# Patient Record
Sex: MALE | Race: WHITE | NOT HISPANIC OR LATINO | Employment: OTHER | URBAN - METROPOLITAN AREA
[De-identification: names, ages, dates, MRNs, and addresses within clinical notes are randomized per-mention and may not be internally consistent; named-entity substitution may affect disease eponyms.]

---

## 2017-07-06 ENCOUNTER — ALLSCRIPTS OFFICE VISIT (OUTPATIENT)
Dept: OTHER | Facility: OTHER | Age: 77
End: 2017-07-06

## 2017-07-06 DIAGNOSIS — Z12.5 ENCOUNTER FOR SCREENING FOR MALIGNANT NEOPLASM OF PROSTATE: ICD-10-CM

## 2017-07-06 LAB
BILIRUB UR QL STRIP: NORMAL
CLARITY UR: NORMAL
COLOR UR: YELLOW
GLUCOSE (HISTORICAL): NORMAL
HGB UR QL STRIP.AUTO: NORMAL
KETONES UR STRIP-MCNC: NORMAL MG/DL
LEUKOCYTE ESTERASE UR QL STRIP: NORMAL
NITRITE UR QL STRIP: NORMAL
PH UR STRIP.AUTO: 6.5 [PH]
PROT UR STRIP-MCNC: NORMAL MG/DL
SP GR UR STRIP.AUTO: 1.02
UROBILINOGEN UR QL STRIP.AUTO: 1

## 2017-07-17 ENCOUNTER — GENERIC CONVERSION - ENCOUNTER (OUTPATIENT)
Dept: OTHER | Facility: OTHER | Age: 77
End: 2017-07-17

## 2018-01-16 NOTE — MISCELLANEOUS
Message   Recorded as Task   Date: 07/17/2017 09:47 AM, Created By: Nadine Mayfield   Task Name: Call Back   Assigned To: Niranjan Moe,TEAM   Regarding Patient: Rama Spurling, Status: Active   [facility] 17 Jul 2017 9:47 AM     TASK CREATED  Caller: Self; Other; [phone] (Home)  PATIENT CALLED ASKING IF WE RECIEVED HIS LAB RESULTS PLEASE CALL HIM BACK   [facility] - 17 Jul 2017 10:06 AM     TASK EDITED  pt had labs done at [address] [place] [address] [address]  I advised him i will need to look and see if the faxed results have been received  If not, I can print them from [address] [place] [address],[address]  Active Problems    1  Acute bronchitis (466 0) (J20 9)   2  Allergic rhinitis (477 9) (J30 9)   3  Chronic interstitial cystitis (595 1) (N30 10)   4  Encounter for prostate cancer screening (V76 44) (Z12 5)   5  Hematospermia (608 82) (R36 1)   6  Hematuria, microscopic (599 72) (R31 29)   7  Hydrocele, right (603 9) (N43 3)   8  Hypertension (401 9) (I10)   9  Obstructive sleep apnea (327 23) (G47 33)    Current Meds   1  Aloe Vera CAPS; Therapy: (Recorded:[id number]) to Recorded   2  Cheratussin -10 MG/5ML Oral Syrup; TAKE 5 ML EVERY 4 TO 6 HOURS AS   NEEDED FOR COUGH; Therapy: 19Apr2016 to (Evaluate:05Tcr0906); Last Rx:[id number] Ordered   3  Daily Vitamins Oral Tablet; Therapy: (Recorded:[id number]) to Recorded   4  Elmiron 100 MG Oral Capsule; TAKE 1 CAPSULE 3 TIMES DAILY; Therapy: 19Apr2016 to Recorded   5  Fluticasone Propionate 50 MCG/ACT Nasal Suspension; USE 2 SPRAYS IN EACH   NOSTRIL ONCE DAILY; Therapy: 19Apr2016 to (Renew:25Jpv9329)  Requested for: 19Apr2016; Last   Rx:19Apr2016 Ordered   6  LevoFLOXacin 500 MG Oral Tablet; TAKE 1 TABLET DAILY UNTIL FINISHED; Therapy: 19Apr2016 to (Hayley Laird)  Requested for: 19Apr2016; Last   Rx:[id number] Ordered   7  Lisinopril 5 MG Oral Tablet; TAKE 1 TABLET DAILY AS DIRECTED; Therapy: 19Apr2016 to Recorded   8   Lisinopril 5 MG Oral Tablet; Therapy: (Recorded:[id number]) to Recorded   9  Mucinex 600 MG Oral Tablet Extended Release 12 Hour; TAKE 1 TABLET EVERY 12   HOURS AS NEEDED FOR CONGESTION; Therapy: 19Apr2016 to (King Melyssa)  Requested for: 19Apr2016; Last   Rx:19Apr2016 Ordered   10  Prelief 340 (65-50) MG (CA-P) Oral Tablet; Therapy: (Recorded:85Ynh6955) to Recorded    Allergies    1  No Known Drug Allergies    2   Gluten    Signatures   Electronically signed by : Phillip Adan, ; Jul 17 2017 10:07AM EST                       (Author)

## 2018-01-22 VITALS
BODY MASS INDEX: 30.42 KG/M2 | SYSTOLIC BLOOD PRESSURE: 131 MMHG | HEIGHT: 74 IN | DIASTOLIC BLOOD PRESSURE: 76 MMHG | WEIGHT: 237 LBS

## 2018-05-18 ENCOUNTER — TELEPHONE (OUTPATIENT)
Dept: UROLOGY | Facility: AMBULATORY SURGERY CENTER | Age: 78
End: 2018-05-18

## 2018-05-18 NOTE — TELEPHONE ENCOUNTER
Ama from Dr Marco Antonio Dorsey office called requesting notes and labs on this patient's last visit with us  Dr Jun Troy is his PCP  I was unable to pull any detailed info for her  Please fax this info over to her at [phone]  If any questions, she can be reached at [phone]

## 2018-06-29 DIAGNOSIS — N30.10 INTERSTITIAL CYSTITIS: Primary | ICD-10-CM

## 2018-06-29 RX ORDER — PENTOSAN POLYSULFATE SODIUM 100 MG/1
CAPSULE, GELATIN COATED ORAL
Qty: 270 CAPSULE | Refills: 2 | Status: SHIPPED | OUTPATIENT
Start: 2018-06-29 | End: 2019-03-26 | Stop reason: SDUPTHER

## 2018-08-15 RX ORDER — MULTIVITAMIN
TABLET ORAL
COMMUNITY

## 2018-08-15 RX ORDER — LISINOPRIL 5 MG/1
1 TABLET ORAL DAILY
COMMUNITY
Start: 2016-04-19

## 2018-08-17 ENCOUNTER — OFFICE VISIT (OUTPATIENT)
Dept: UROLOGY | Facility: MEDICAL CENTER | Age: 78
End: 2018-08-17
Payer: MEDICARE

## 2018-08-17 VITALS
HEIGHT: 74 IN | WEIGHT: 239 LBS | BODY MASS INDEX: 30.67 KG/M2 | SYSTOLIC BLOOD PRESSURE: 162 MMHG | DIASTOLIC BLOOD PRESSURE: 88 MMHG

## 2018-08-17 DIAGNOSIS — N30.10 INTERSTITIAL CYSTITIS: Primary | ICD-10-CM

## 2018-08-17 DIAGNOSIS — Z12.5 ENCOUNTER FOR PROSTATE CANCER SCREENING: ICD-10-CM

## 2018-08-17 DIAGNOSIS — N43.3 HYDROCELE, BILATERAL: ICD-10-CM

## 2018-08-17 LAB
SL AMB  POCT GLUCOSE, UA: ABNORMAL
SL AMB LEUKOCYTE ESTERASE,UA: ABNORMAL
SL AMB POCT BILIRUBIN,UA: ABNORMAL
SL AMB POCT BLOOD,UA: ABNORMAL
SL AMB POCT CLARITY,UA: CLEAR
SL AMB POCT COLOR,UA: YELLOW
SL AMB POCT KETONES,UA: ABNORMAL
SL AMB POCT NITRITE,UA: ABNORMAL
SL AMB POCT PH,UA: 5.5
SL AMB POCT SPECIFIC GRAVITY,UA: 1.01
SL AMB POCT URINE PROTEIN: ABNORMAL
SL AMB POCT UROBILINOGEN: 0.2

## 2018-08-17 PROCEDURE — 99214 OFFICE O/P EST MOD 30 MIN: CPT | Performed by: UROLOGY

## 2018-08-17 PROCEDURE — 81003 URINALYSIS AUTO W/O SCOPE: CPT | Performed by: UROLOGY

## 2018-08-17 RX ORDER — ASPIRIN 81 MG/1
81 TABLET ORAL DAILY
COMMUNITY

## 2018-08-17 NOTE — LETTER
2018     Dain Bae DO  [id number] [facility]    Patient: Pollo Bradley   YOB: 1940   Date of Visit: 2018       Dear Dr Nathan Topete: Thank you for referring Pollo Bradley to me for evaluation  Below are my notes for this consultation  If you have questions, please do not hesitate to call me  I look forward to following your patient along with you  Sincerely,        Deb Almaguer MD        CC: No Recipients  Deb Almaguer MD  2018  1:45 PM  Sign at close encounter  [address] [facility]  [address], [address] [place] [address]  [phone]  www  [facility]  org      NAME: Pollo Bradley  AGE: 78 y o  SEX: male  : 1940   MRN: [id number]    DATE: 2018  TIME: 1:35 PM    Assessment and Plan:    Interstitial cystitis, well managed with Elmiron  Bilateral hydroceles, right greater than left  We discussed surgical intervention versus observation  They do not really bother him very much, so surgical intervention is not absolutely warranted  We will get an ultrasound of the scrotum and I will send him the results  We will also check PSA and send him the results of that  If he decides that he wants the hydroceles treated in the meantime he can give me a call  Follow-up in 1 year  Chief Complaint     Chief Complaint   Patient presents with    interstitial cystitis     6 months ck       History of Present Illness   Here for follow-up of his her matter sperm and interstitial cystitis  He has been on Elmiron  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 and a bladder biopsy that showed ulceration only  I have performed cystoscopy with biopsy in , 2013 in   He also has an asymptomatic right hydrocele  He has nocturia 1 time a night    His main complaint this visit is pain in his bladder If he sleeps for 3-4 hours at a stretch     The pain dissipates when he sits up and when he goes to walk  During the days doing well and less he left his bladder get too full that he has pain in the bladder  No recent PSA  Last PSA was 1 49 7/10/2017  The following portions of the patient's history were reviewed and updated as appropriate: allergies, current medications, past family history, past medical history, past social history, past surgical history and problem list     Review of Systems   Review of Systems   Genitourinary: Negative  Active Problem List   There is no problem list on file for this patient  Objective   /88 (BP Location: Left arm, Patient Position: Sitting)   Ht 6' 2" (1 88 m)   Wt 108 kg (239 lb)   BMI 30 69 kg/m²      Physical Exam   Constitutional: He is oriented to person, place, and time  He appears well-developed and well-nourished  HENT:   Head: Normocephalic and atraumatic  Eyes: EOM are normal    Neck: Normal range of motion  Pulmonary/Chest: Effort normal    Abdominal: Soft  Genitourinary: Rectum normal and prostate normal    Genitourinary Comments: Has bilateral hydroceles, right greater than left  The right side is about 7 cm this year and he has about a 4-5 cm hydrocele on the left  The testicles themselves are normal   No hernia  Rectal exam reveals normal tone no masses and the prostate is about 60 g, smooth symmetric and benign  Musculoskeletal: Normal range of motion  Neurological: He is alert and oriented to person, place, and time  Skin: Skin is warm and dry  Psychiatric: He has a normal mood and affect   His behavior is normal  Judgment and thought content normal            Current Medications     Current Outpatient Prescriptions:     aspirin (ECOTRIN LOW STRENGTH) 81 mg EC tablet, Take 81 mg by mouth daily, Disp: , Rfl:     Calcium Glycerophosphate (PRELIEF) 340 (65-50) MG (CA-P) TABS, Take by mouth, Disp: , Rfl:    ELMIRON 100 MG capsule, TAKE 1 CAPSULE THREE TIMES A DAY, Disp: 270 capsule, Rfl: 2    lisinopril (ZESTRIL) 5 mg tablet, Take 1 tablet by mouth daily, Disp: , Rfl:     Multiple Vitamin (DAILY VITAMINS) tablet, Take by mouth, Disp: , Rfl:         Fredrick Jeong MD

## 2018-08-17 NOTE — PROGRESS NOTES
[address] [facility]  [facility]  [address] [facility]  [place], [address] [facility]  [phone]  www  [facility]  org      NAME: Chidi Johnson  AGE: 78 y o  SEX: male  : 1940   MRN: [id number]    DATE: 2018  TIME: 1:35 PM    Assessment and Plan:    Interstitial cystitis, well managed with Elmiron  Bilateral hydroceles, right greater than left  We discussed surgical intervention versus observation  They do not really bother him very much, so surgical intervention is not absolutely warranted  We will get an ultrasound of the scrotum and I will send him the results  We will also check PSA and send him the results of that  If he decides that he wants the hydroceles treated in the meantime he can give me a call  Follow-up in 1 year  Chief Complaint     Chief Complaint   Patient presents with    interstitial cystitis     6 months ck       History of Present Illness   Here for follow-up of his her matter sperm and interstitial cystitis  He has been on Elmiron  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 and a bladder biopsy that showed ulceration only  I have performed cystoscopy with biopsy in , 2013 in   He also has an asymptomatic right hydrocele  He has nocturia 1 time a night  His main complaint this visit is pain in his bladder   If he sleeps for 3-4 hours at a stretch     The pain dissipates when he sits up and when he goes to walk  During the days doing well and less he left his bladder get too full that he has pain in the bladder  No recent PSA  Last PSA was 1 49 7/10/2017          The following portions of the patient's history were reviewed and updated as appropriate: allergies, current medications, past family history, past medical history, past social history, past surgical history and problem list     Review of Systems   Review of Systems   Genitourinary: Negative  Active Problem List   There is no problem list on file for this patient  Objective   /88 (BP Location: Left arm, Patient Position: Sitting)   Ht 6' 2" (1 88 m)   Wt 108 kg (239 lb)   BMI 30 69 kg/m²     Physical Exam   Constitutional: He is oriented to person, place, and time  He appears well-developed and well-nourished  HENT:   Head: Normocephalic and atraumatic  Eyes: EOM are normal    Neck: Normal range of motion  Pulmonary/Chest: Effort normal    Abdominal: Soft  Genitourinary: Rectum normal and prostate normal    Genitourinary Comments: Has bilateral hydroceles, right greater than left  The right side is about 7 cm this year and he has about a 4-5 cm hydrocele on the left  The testicles themselves are normal   No hernia  Rectal exam reveals normal tone no masses and the prostate is about 60 g, smooth symmetric and benign  Musculoskeletal: Normal range of motion  Neurological: He is alert and oriented to person, place, and time  Skin: Skin is warm and dry  Psychiatric: He has a normal mood and affect   His behavior is normal  Judgment and thought content normal            Current Medications     Current Outpatient Prescriptions:     aspirin (ECOTRIN LOW STRENGTH) 81 mg EC tablet, Take 81 mg by mouth daily, Disp: , Rfl:     Calcium Glycerophosphate (PRELIEF) 340 (65-50) MG (CA-P) TABS, Take by mouth, Disp: , Rfl:     ELMIRON 100 MG capsule, TAKE 1 CAPSULE THREE TIMES A DAY, Disp: 270 capsule, Rfl: 2    lisinopril (ZESTRIL) 5 mg tablet, Take 1 tablet by mouth daily, Disp: , Rfl:     Multiple Vitamin (DAILY VITAMINS) tablet, Take by mouth, Disp: , Rfl:         Marysol Jessica MD

## 2018-08-21 ENCOUNTER — HOSPITAL ENCOUNTER (OUTPATIENT)
Dept: ULTRASOUND IMAGING | Facility: HOSPITAL | Age: 78
Discharge: HOME | End: 2018-08-21
Payer: MEDICARE

## 2018-08-21 DIAGNOSIS — N43.3 HYDROCELE, BILATERAL: ICD-10-CM

## 2018-08-21 PROCEDURE — 76870 US EXAM SCROTUM: CPT

## 2018-08-23 ENCOUNTER — TELEPHONE (OUTPATIENT)
Dept: UROLOGY | Facility: AMBULATORY SURGERY CENTER | Age: 78
End: 2018-08-23

## 2018-08-23 NOTE — TELEPHONE ENCOUNTER
Spoke with patient's wife,she  would like a call back from one of the nurses  She said she got a call, but I do not see any documentation here  Please advise and call back

## 2019-03-26 DIAGNOSIS — N30.10 INTERSTITIAL CYSTITIS: ICD-10-CM

## 2019-03-26 RX ORDER — PENTOSAN POLYSULFATE SODIUM 100 MG/1
CAPSULE, GELATIN COATED ORAL
Qty: 270 CAPSULE | Refills: 2 | Status: SHIPPED | OUTPATIENT
Start: 2019-03-26 | End: 2019-12-21 | Stop reason: SDUPTHER

## 2019-08-23 RX ORDER — LORATADINE 10 MG/1
10 TABLET ORAL DAILY
COMMUNITY
Start: 2019-04-08 | End: 2019-08-26 | Stop reason: ALTCHOICE

## 2019-08-26 ENCOUNTER — OFFICE VISIT (OUTPATIENT)
Dept: UROLOGY | Facility: MEDICAL CENTER | Age: 79
End: 2019-08-26
Payer: MEDICARE

## 2019-08-26 VITALS
SYSTOLIC BLOOD PRESSURE: 140 MMHG | BODY MASS INDEX: 30.42 KG/M2 | HEART RATE: 75 BPM | DIASTOLIC BLOOD PRESSURE: 90 MMHG | WEIGHT: 237 LBS | HEIGHT: 74 IN

## 2019-08-26 DIAGNOSIS — N43.2 OTHER HYDROCELE: ICD-10-CM

## 2019-08-26 DIAGNOSIS — N30.10 INTERSTITIAL CYSTITIS: Primary | ICD-10-CM

## 2019-08-26 LAB
SL AMB  POCT GLUCOSE, UA: ABNORMAL
SL AMB LEUKOCYTE ESTERASE,UA: ABNORMAL
SL AMB POCT BILIRUBIN,UA: ABNORMAL
SL AMB POCT BLOOD,UA: ABNORMAL
SL AMB POCT CLARITY,UA: CLEAR
SL AMB POCT COLOR,UA: YELLOW
SL AMB POCT KETONES,UA: ABNORMAL
SL AMB POCT NITRITE,UA: ABNORMAL
SL AMB POCT PH,UA: 6
SL AMB POCT SPECIFIC GRAVITY,UA: 1.02
SL AMB POCT URINE PROTEIN: ABNORMAL
SL AMB POCT UROBILINOGEN: 1

## 2019-08-26 PROCEDURE — 99214 OFFICE O/P EST MOD 30 MIN: CPT | Performed by: UROLOGY

## 2019-08-26 PROCEDURE — 81003 URINALYSIS AUTO W/O SCOPE: CPT | Performed by: UROLOGY

## 2019-08-26 NOTE — H&P
[address] [facility]  [facility]  [address] [facility]  [place], [address] [place] [address]  [phone]  www  [facility]  org      NAME: Mariana Munson  AGE: 70 y o  SEX: male  : 1940   MRN: [id number]    DATE: 2019  TIME: 1:47 PM    Assessment and Plan:    Bilateral hydroceles: He would like to have these repaired  He will therefore be scheduled for bilateral hydrocelectomy in the operating room  Interstitial cystitis:  Under good control on Elmiron and Prelief  Chief Complaint   No chief complaint on file  History of Present Illness   Yearly follow-up of interstitial cystitis, and bilateral hydroceles  Also encounter for prostate cancer screening  PSA was 1 07 as of 2019  Scrotal ultrasound 1 year ago showed large bilateral scrotal hydroceles increased since the prior examination with no lesions  He continues on Elmiron  He also takes Prelief  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  Bilateral hydroceles: These have gotten larger  He would like to have them repaired  I explained him the procedure of hydrocelectomy, bilateral and the risks of bleeding, infection, loss of both or 1 of the testicles  There is also a small recurrence rate  He gives informed consent  The following portions of the patient's history were reviewed and updated as appropriate: allergies, current medications, past family history, past medical history, past social history, past surgical history and problem list   Past Medical History:   Diagnosis Date    Bladder pain     Dysuria     Hematospermia     Interstitial cystitis     Microscopic hematuria      Past Surgical History:   Procedure Laterality Date    BACK SURGERY      CYSTOSCOPY      CYSTOSTOMY W/ BLADDER BIOPSY  2011     shoulder  Review of Systems   Review of Systems   Constitutional: Negative for activity change, appetite change, fever and unexpected weight change  Respiratory: Negative  Cardiovascular: Negative  Gastrointestinal: Negative  Genitourinary: Positive for scrotal swelling  Negative for decreased urine volume, difficulty urinating, discharge, dysuria, enuresis, flank pain, frequency, genital sores, hematuria, penile pain, penile swelling, testicular pain and urgency  Neurological: Negative  Hematological: Negative  Active Problem List   There is no problem list on file for this patient  Objective   /90   Pulse 75   Ht 6' 2" (1 88 m)   Wt 108 kg (237 lb)   BMI 30 43 kg/m²      Physical Exam   Constitutional: He is oriented to person, place, and time  He appears well-developed and well-nourished  HENT:   Head: Normocephalic and atraumatic  Eyes: Conjunctivae and EOM are normal    Neck: Normal range of motion  Neck supple  Cardiovascular: Normal rate, regular rhythm and normal heart sounds  Exam reveals no gallop and no friction rub  No murmur heard  Pulmonary/Chest: Effort normal and breath sounds normal  No stridor  No respiratory distress  He has no wheezes  He has no rales  He exhibits no tenderness  Abdominal: Soft  He exhibits no distension and no mass  There is no tenderness  There is no rebound and no guarding  No hernia  Genitourinary: Rectum normal, prostate normal and penis normal  No penile tenderness  Genitourinary Comments: Rectal exam reveals normal tone, no masses and his prostate was about 40 g, smooth symmetric and benign  No nodules  He has normal circumcised phallus  He has bilateral large hydroceles  Musculoskeletal: Normal range of motion  Neurological: He is alert and oriented to person, place, and time  Skin: Skin is warm and dry  No erythema  No pallor  Psychiatric: He has a normal mood and affect   His behavior is normal  Judgment and thought content normal            Current Medications     Current Outpatient Medications:     aspirin (ECOTRIN LOW STRENGTH) 81 mg EC tablet, Take 81 mg by mouth daily, Disp: , Rfl:     Calcium Glycerophosphate (PRELIEF) 340 (65-50) MG (CA-P) TABS, Take by mouth, Disp: , Rfl:     ELMIRON 100 MG capsule, TAKE 1 CAPSULE THREE TIMES A DAY, Disp: 270 capsule, Rfl: 2    lisinopril (ZESTRIL) 5 mg tablet, Take 1 tablet by mouth daily, Disp: , Rfl:     Multiple Vitamin (DAILY VITAMINS) tablet, Take by mouth, Disp: , Rfl:         Adwoa Becker MD

## 2019-08-26 NOTE — PROGRESS NOTES
[address] [facility]  [facility]  [address] [facility]  [place], [address] [place] [address]  [phone]  www  [facility]  org      NAME: Shannan Luu  AGE: 70 y o  SEX: male  : 1940   MRN: [id number]    DATE: 2019  TIME: 1:47 PM    Assessment and Plan:    Bilateral hydroceles: He would like to have these repaired  He will therefore be scheduled for bilateral hydrocelectomy in the operating room  Interstitial cystitis:  Under good control on Elmiron and Prelief  Chief Complaint   No chief complaint on file  History of Present Illness   Yearly follow-up of interstitial cystitis, and bilateral hydroceles  Also encounter for prostate cancer screening  PSA was 1 07 as of 2019  Scrotal ultrasound 1 year ago showed large bilateral scrotal hydroceles increased since the prior examination with no lesions  He continues on Elmiron  He also takes Prelief  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  Bilateral hydroceles: These have gotten larger  He would like to have them repaired  I explained him the procedure of hydrocelectomy, bilateral and the risks of bleeding, infection, loss of both or 1 of the testicles  There is also a small recurrence rate  He gives informed consent  The following portions of the patient's history were reviewed and updated as appropriate: allergies, current medications, past family history, past medical history, past social history, past surgical history and problem list   Past Medical History:   Diagnosis Date    Bladder pain     Dysuria     Hematospermia     Interstitial cystitis     Microscopic hematuria      Past Surgical History:   Procedure Laterality Date    BACK SURGERY  2015    CYSTOSCOPY      CYSTOSTOMY W/ BLADDER BIOPSY  2011     shoulder  Review of Systems   Review of Systems   Constitutional: Negative for activity change, appetite change, fever and unexpected weight change  Respiratory: Negative  Cardiovascular: Negative  Gastrointestinal: Negative  Genitourinary: Positive for scrotal swelling  Negative for decreased urine volume, difficulty urinating, discharge, dysuria, enuresis, flank pain, frequency, genital sores, hematuria, penile pain, penile swelling, testicular pain and urgency  Neurological: Negative  Hematological: Negative  Active Problem List   There is no problem list on file for this patient  Objective   /90   Pulse 75   Ht 6' 2" (1 88 m)   Wt 108 kg (237 lb)   BMI 30 43 kg/m²     Physical Exam   Constitutional: He is oriented to person, place, and time  He appears well-developed and well-nourished  HENT:   Head: Normocephalic and atraumatic  Eyes: Conjunctivae and EOM are normal    Neck: Normal range of motion  Neck supple  Cardiovascular: Normal rate, regular rhythm and normal heart sounds  Exam reveals no gallop and no friction rub  No murmur heard  Pulmonary/Chest: Effort normal and breath sounds normal  No stridor  No respiratory distress  He has no wheezes  He has no rales  He exhibits no tenderness  Abdominal: Soft  He exhibits no distension and no mass  There is no tenderness  There is no rebound and no guarding  No hernia  Genitourinary: Rectum normal, prostate normal and penis normal  No penile tenderness  Genitourinary Comments: Rectal exam reveals normal tone, no masses and his prostate was about 40 g, smooth symmetric and benign  No nodules  He has normal circumcised phallus  He has bilateral large hydroceles  Musculoskeletal: Normal range of motion  Neurological: He is alert and oriented to person, place, and time  Skin: Skin is warm and dry  No erythema  No pallor  Psychiatric: He has a normal mood and affect   His behavior is normal  Judgment and thought content normal            Current Medications     Current Outpatient Medications:     aspirin (ECOTRIN LOW STRENGTH) 81 mg EC tablet, Take 81 mg by mouth daily, Disp: , Rfl:     Calcium Glycerophosphate (PRELIEF) 340 (65-50) MG (CA-P) TABS, Take by mouth, Disp: , Rfl:     ELMIRON 100 MG capsule, TAKE 1 CAPSULE THREE TIMES A DAY, Disp: 270 capsule, Rfl: 2    lisinopril (ZESTRIL) 5 mg tablet, Take 1 tablet by mouth daily, Disp: , Rfl:     Multiple Vitamin (DAILY VITAMINS) tablet, Take by mouth, Disp: , Rfl:         Farrukh Alvarez MD

## 2019-08-27 ENCOUNTER — TELEPHONE (OUTPATIENT)
Dept: UROLOGY | Facility: MEDICAL CENTER | Age: 79
End: 2019-08-27

## 2019-08-28 NOTE — TELEPHONE ENCOUNTER
Patient wife called to advised that the patient decided to not follow through with surgery  Patient wife advised that he would probably need an appointment to see the Dr for follow up  Please advise

## 2019-12-21 DIAGNOSIS — N30.10 INTERSTITIAL CYSTITIS: ICD-10-CM

## 2019-12-23 RX ORDER — PENTOSAN POLYSULFATE SODIUM 100 MG/1
CAPSULE, GELATIN COATED ORAL
Qty: 270 CAPSULE | Refills: 4 | Status: SHIPPED | OUTPATIENT
Start: 2019-12-23 | End: 2021-03-17

## 2020-03-24 LAB — HBA1C MFR BLD HPLC: 5.2 %

## 2020-11-30 ENCOUNTER — TELEPHONE (OUTPATIENT)
Dept: UROLOGY | Facility: MEDICAL CENTER | Age: 80
End: 2020-11-30

## 2021-03-17 DIAGNOSIS — N30.10 INTERSTITIAL CYSTITIS: ICD-10-CM

## 2021-03-17 RX ORDER — PENTOSAN POLYSULFATE SODIUM 100 MG/1
CAPSULE, GELATIN COATED ORAL
Qty: 270 CAPSULE | Refills: 3 | Status: SHIPPED | OUTPATIENT
Start: 2021-03-17 | End: 2022-05-03

## 2021-04-30 RX ORDER — ATORVASTATIN CALCIUM 10 MG/1
TABLET, FILM COATED ORAL
COMMUNITY
Start: 2021-02-22

## 2021-05-03 ENCOUNTER — OFFICE VISIT (OUTPATIENT)
Dept: UROLOGY | Facility: MEDICAL CENTER | Age: 81
End: 2021-05-03
Payer: MEDICARE

## 2021-05-03 VITALS — WEIGHT: 240 LBS | BODY MASS INDEX: 30.8 KG/M2 | HEIGHT: 74 IN

## 2021-05-03 DIAGNOSIS — N30.10 INTERSTITIAL CYSTITIS: Primary | ICD-10-CM

## 2021-05-03 LAB
SL AMB  POCT GLUCOSE, UA: NORMAL
SL AMB LEUKOCYTE ESTERASE,UA: NORMAL
SL AMB POCT BILIRUBIN,UA: NORMAL
SL AMB POCT BLOOD,UA: NORMAL
SL AMB POCT CLARITY,UA: CLEAR
SL AMB POCT COLOR,UA: YELLOW
SL AMB POCT KETONES,UA: NORMAL
SL AMB POCT NITRITE,UA: NORMAL
SL AMB POCT PH,UA: 6.5
SL AMB POCT SPECIFIC GRAVITY,UA: 1.02
SL AMB POCT URINE PROTEIN: NORMAL
SL AMB POCT UROBILINOGEN: 0.2

## 2021-05-03 PROCEDURE — 81003 URINALYSIS AUTO W/O SCOPE: CPT | Performed by: PHYSICIAN ASSISTANT

## 2021-05-03 PROCEDURE — 99213 OFFICE O/P EST LOW 20 MIN: CPT | Performed by: PHYSICIAN ASSISTANT

## 2021-05-03 NOTE — PROGRESS NOTES
5/3/2021      Chief Complaint   Patient presents with    Testicle Swelling         Assessment and Plan    68 y o  male managed by Dr Lina Boyd    1  Bilateral hydrocele  · Cancelled surgical intervention last year  · Symptoms unchanged from last year  · Denies any pain  · Scrotal support reviewed with patient  · Urine today: negative  · Instructed to call our office if his symptoms progress or become more bothersome  · Otherwise will follow-up in 1 year  2  Interstitial cystitis   · Well controlled on Elmiron  · No refills needed at this time  History of Present Illness  Emilia Pickard is a 68 y o  male here for evaluation of bilateral hydroceles and interstitial cystitis  Patient is doing very well today  He denies any testicular pain associated with his bilateral hydroceles  He explained that when he 1st discussed the surgical intervention with Dr Lina Boyd, he was actively riding motorcycles and hyper temp self up for surgery  After further contemplation he decided that it was not necessary and he was interested in going forward with that  Continues to feel as if he does not need this surgical intervention at this time  He denies any urinary symptoms including dysuria, frequency, urgency, weaker intermittent stream, straining to urinate, or sensation of incomplete bladder emptying  He does have have occasional hesitancy in the mornings but denies any issues with incontinence or enuresis  He reports 1 episode of gross hematuria back in March of 2021 after he had a fall  States that his PCP worked his urine up following the incident and did not find any traces of blood  He has not had any subsequent episodes  He continues to feel well controlled on Elmiron for his interstitial cystitis  He is overall doing very well and is agreeable to follow-up in 1 year  He is instructed to call our office if his bilateral hydroceles begin to give him pain or become more bothersome        Review of Systems   Constitutional: Negative for chills and fever  HENT: Negative  Eyes: Negative  Respiratory: Negative  Gastrointestinal: Negative for abdominal pain, constipation, diarrhea, nausea and vomiting  Genitourinary: Positive for scrotal swelling (Hydrocele symptoms remain the same from last year) and urgency (Rarely)  Negative for dysuria, flank pain, frequency, hematuria (1 epsiode in March 2021 s/p fall) and testicular pain  Confirms hesitancy in the mornings  Denies weak or intermittent stream    Denies sensation of incomplete bladder emptying  Denies issues with incontinence  Musculoskeletal: Positive for back pain (Low back pain)  Allergic/Immunologic: Negative  Neurological: Negative  Hematological: Negative  Psychiatric/Behavioral: Negative  AUA SYMPTOM SCORE      Most Recent Value   AUA SYMPTOM SCORE   How often have you had a sensation of not emptying your bladder completely after you finished urinating? 0   How often have you had to urinate again less than two hours after you finished urinating? 0   How often have you found you stopped and started again several times when you urinate?  0   How often have you found it difficult to postpone urination? 0   How often have you had a weak urinary stream?  0   How often have you had to push or strain to begin urination? 0   How many times did you most typically get up to urinate from the time you went to bed at night until the time you got up in the morning? 1   Quality of Life: If you were to spend the rest of your life with your urinary condition just the way it is now, how would you feel about that?  0   AUA SYMPTOM SCORE  1           Vitals  Vitals:    05/03/21 1051   Weight: 109 kg (240 lb)   Height: 6' 2" (1 88 m)       Physical Exam  Constitutional:       General: He is not in acute distress  Appearance: Normal appearance  He is obese  He is not ill-appearing, toxic-appearing or diaphoretic     HENT: Head: Normocephalic and atraumatic  Eyes:      Conjunctiva/sclera: Conjunctivae normal    Neck:      Musculoskeletal: Normal range of motion  Pulmonary:      Effort: Pulmonary effort is normal  No respiratory distress  Abdominal:      General: There is no distension  Palpations: Abdomen is soft  Tenderness: There is no abdominal tenderness  There is no right CVA tenderness, left CVA tenderness, guarding or rebound  Genitourinary:     Comments: Circumcised penis, normal phallus, orthotopic patent meatus  Testes smooth descended bilaterally into the scrotum nontender  Bilateral hydroceles appreciated on exam, R>L  Musculoskeletal: Normal range of motion  Skin:     General: Skin is warm and dry  Neurological:      General: No focal deficit present  Mental Status: He is alert and oriented to person, place, and time  Psychiatric:         Mood and Affect: Mood normal          Behavior: Behavior normal          Thought Content:  Thought content normal          Judgment: Judgment normal        Past History  Past Medical History:   Diagnosis Date    Bladder pain     Dysuria     Hematospermia     Interstitial cystitis     Microscopic hematuria      Social History     Socioeconomic History    Marital status: /Civil Union     Spouse name: None    Number of children: None    Years of education: None    Highest education level: None   Occupational History    None   Social Needs    Financial resource strain: None    Food insecurity     Worry: None     Inability: None    Transportation needs     Medical: None     Non-medical: None   Tobacco Use    Smoking status: Never Smoker    Smokeless tobacco: Never Used   Substance and Sexual Activity    Alcohol use: Yes     Comment: 1/month    Drug use: No    Sexual activity: None   Lifestyle    Physical activity     Days per week: None     Minutes per session: None    Stress: None   Relationships    Social connections     Talks on phone: None     Gets together: None     Attends Caodaism service: None     Active member of club or organization: None     Attends meetings of clubs or organizations: None     Relationship status: None    Intimate partner violence     Fear of current or ex partner: None     Emotionally abused: None     Physically abused: None     Forced sexual activity: None   Other Topics Concern    None   Social History Narrative    None     Social History     Tobacco Use   Smoking Status Never Smoker   Smokeless Tobacco Never Used     Family History   Problem Relation Age of Onset    Dementia Mother     Heart disease Father        The following portions of the patient's history were reviewed and updated as appropriate: allergies, current medications, past medical history, past social history, past surgical history and problem list     Results - results found in Care everywhere    Recent Results (from the past 1 hour(s))   POCT urine dip auto non-scope    Collection Time: 05/03/21 11:26 AM   Result Value Ref Range     COLOR,UA yellow     CLARITY,UA clear     SPECIFIC GRAVITY,UA 1 020      PH,UA 6 5     LEUKOCYTE ESTERASE,UA neg     NITRITE,UA neg     GLUCOSE, UA neg     KETONES,UA neg     BILIRUBIN,UA neg     BLOOD,UA neg     POCT URINE PROTEIN neg     SL AMB POCT UROBILINOGEN 0 2    ]  No results found for: PSA  No results found for: GLUCOSE, CALCIUM, NA, K, CO2, CL, BUN, CREATININE  No results found for: WBC, HGB, HCT, MCV, PLT    Yulisa Merritt PA-C

## 2022-05-03 DIAGNOSIS — N30.10 INTERSTITIAL CYSTITIS: ICD-10-CM

## 2022-05-03 RX ORDER — PENTOSAN POLYSULFATE SODIUM 100 MG/1
CAPSULE, GELATIN COATED ORAL
Qty: 270 CAPSULE | Refills: 3 | Status: SHIPPED | OUTPATIENT
Start: 2022-05-03

## 2022-09-23 ENCOUNTER — OFFICE VISIT (OUTPATIENT)
Dept: UROLOGY | Facility: MEDICAL CENTER | Age: 82
End: 2022-09-23
Payer: MEDICARE

## 2022-09-23 VITALS
SYSTOLIC BLOOD PRESSURE: 128 MMHG | BODY MASS INDEX: 30.91 KG/M2 | HEIGHT: 73 IN | DIASTOLIC BLOOD PRESSURE: 82 MMHG | HEART RATE: 66 BPM | OXYGEN SATURATION: 97 % | WEIGHT: 233.2 LBS

## 2022-09-23 DIAGNOSIS — Z12.5 PROSTATE CANCER SCREENING: ICD-10-CM

## 2022-09-23 DIAGNOSIS — N30.10 INTERSTITIAL CYSTITIS: Primary | ICD-10-CM

## 2022-09-23 LAB
SL AMB  POCT GLUCOSE, UA: NORMAL
SL AMB LEUKOCYTE ESTERASE,UA: NORMAL
SL AMB POCT BILIRUBIN,UA: NORMAL
SL AMB POCT BLOOD,UA: NORMAL
SL AMB POCT CLARITY,UA: CLEAR
SL AMB POCT COLOR,UA: YELLOW
SL AMB POCT KETONES,UA: NORMAL
SL AMB POCT NITRITE,UA: NORMAL
SL AMB POCT PH,UA: 6.5
SL AMB POCT SPECIFIC GRAVITY,UA: 1.01
SL AMB POCT URINE PROTEIN: NORMAL
SL AMB POCT UROBILINOGEN: 0.2

## 2022-09-23 PROCEDURE — 99214 OFFICE O/P EST MOD 30 MIN: CPT | Performed by: PHYSICIAN ASSISTANT

## 2022-09-23 PROCEDURE — 81003 URINALYSIS AUTO W/O SCOPE: CPT | Performed by: PHYSICIAN ASSISTANT

## 2022-09-23 NOTE — PROGRESS NOTES
9/23/2022      Chief Complaint   Patient presents with    interstitial cystitis         Assessment and Plan    80 y o  male managed by Dr Maisha Bui     1  Bilateral hydroceles  · Remains asymptomatic  · Again reviewed surgical intervention at this time  Patient feels comfortable with observation given lack of symptoms  · Instructed to call if he becomes symptomatic    2  Interstitial cystitis   · Urine today: negative   · Controlled on [place]  · No refills needed at this time  · Denies history of DVTs    3  Prostate cancer screening   · Reviewed AUA guidelines for prostate cancer screening  Patient wishes to continue annual prostate cancer screening for now  · PSA ordered  Reviewed activities to avoid 72 hours prior and to wait 2 weeks after today's KASANDRA  · KASANDRA today revealed smooth, mildly enlarged prostate without appreciable nodule  · Will call with PSA results       History of Present Illness  Kj Lerma is a 80 y o  male here for evaluation of bilateral hydroceles and interstitial cystitis  Patient is doing very well today  He denies any testicular pain associated with his bilateral hydroceles  He was previously scheduled for hydrocelectomy in the OR with Dr Maisha Bui but patient had decided not to proceed with this procedure  He has significantly decreased his motorcycle riding denies any pain or affect of ADLs given bilateral hydroceles  He offers no urinary complaints today  He feels well controlled on the [place]miron from an IC perspective  He denies any history of DVTs  Denies any family history of prostate cancer but is agreeable to continuing annual screening  Review of Systems   Constitutional: Negative for chills and fever  HENT: Negative  Respiratory: Negative  Cardiovascular: Negative  Gastrointestinal: Negative for abdominal pain, nausea and vomiting  Genitourinary: Positive for scrotal swelling (unchanged)   Negative for difficulty urinating, dysuria, flank pain, frequency, hematuria, testicular pain and urgency  Musculoskeletal: Negative  Skin: Negative  Neurological: Negative  Vitals  Vitals:    09/23/22 1052   BP: 128/82   BP Location: Left arm   Patient Position: Sitting   Cuff Size: Adult   Pulse: 66   SpO2: 97%   Weight: 106 kg (233 lb 3 2 oz)   Height: 6' 1" (1 854 m)       Physical Exam  Vitals reviewed  Constitutional:       General: He is not in acute distress  Appearance: Normal appearance  He is obese  He is not ill-appearing, toxic-appearing or diaphoretic  HENT:      Head: Normocephalic and atraumatic  Eyes:      Conjunctiva/sclera: Conjunctivae normal    Pulmonary:      Effort: Pulmonary effort is normal  No respiratory distress  Abdominal:      General: There is no distension  Palpations: Abdomen is soft  Tenderness: There is no abdominal tenderness  There is no right CVA tenderness, left CVA tenderness, guarding or rebound  Musculoskeletal:         General: Normal range of motion  Cervical back: Normal range of motion  Skin:     General: Skin is warm and dry  Neurological:      General: No focal deficit present  Mental Status: He is alert and oriented to person, place, and time  Psychiatric:         Mood and Affect: Mood normal          Behavior: Behavior normal          Thought Content:  Thought content normal          Judgment: Judgment normal        Past History  Past Medical History:   Diagnosis Date    Bladder pain     Dysuria     Hematospermia     Interstitial cystitis     Microscopic hematuria      Social History     Socioeconomic History    Marital status: /Civil Union     Spouse name: None    Number of children: None    Years of education: None    Highest education level: None   Occupational History    None   Tobacco Use    Smoking status: Never Smoker    Smokeless tobacco: Never Used   Vaping Use    Vaping Use: Never used   Substance and Sexual Activity    Alcohol use: Yes     Comment: 1/month    Drug use: No    Sexual activity: None   Other Topics Concern    None   Social History Narrative    None     Social Determinants of Health     Financial Resource Strain: Not on file   Food Insecurity: Not on file   Transportation Needs: Not on file   Physical Activity: Not on file   Stress: Not on file   Social Connections: Not on file   Intimate Partner Violence: Not on file   Housing Stability: Not on file     Social History     Tobacco Use   Smoking Status Never Smoker   Smokeless Tobacco Never Used     Family History   Problem Relation Age of Onset    Dementia Mother     Heart disease Father        The following portions of the patient's history were reviewed and updated as appropriate: allergies, current medications, past medical history, past social history, past surgical history and problem list     Results  Recent Results (from the past 1 hour(s))   POCT urine dip auto non-scope    Collection Time: 09/23/22 10:58 AM   Result Value Ref Range     COLOR,UA yellow     CLARITY,UA clear     SPECIFIC GRAVITY,UA 1 015      PH,UA 6 5     LEUKOCYTE ESTERASE,UA neg     NITRITE,UA neg     GLUCOSE, UA neg     KETONES,UA neg     BILIRUBIN,UA neg     BLOOD,UA neg     POCT URINE PROTEIN neg     SL AMB POCT UROBILINOGEN 0 2    ]  No results found for: PSA  No results found for: GLUCOSE, CALCIUM, NA, K, CO2, CL, BUN, CREATININE  No results found for: WBC, HGB, HCT, MCV, PLT    Scott Agudelo PA-C

## 2022-09-23 NOTE — PATIENT INSTRUCTIONS
PSA due in 2 weeks  Please avoid any ejaculation or direct pressure to the scrotum 3 days before testing  Will call with results

## 2022-11-16 ENCOUNTER — TELEPHONE (OUTPATIENT)
Dept: UROLOGY | Facility: MEDICAL CENTER | Age: 82
End: 2022-11-16

## 2022-11-16 DIAGNOSIS — Z12.5 PROSTATE CANCER SCREENING: Primary | ICD-10-CM

## 2022-11-16 NOTE — TELEPHONE ENCOUNTER
Patricia Majano PA-C   11/16/2022 10:01 AM EST Back to Top      PSA stable at 1 29  New PSA orders in for next year       Thanks      Contacted patient advised per provider recommendation of results   Advised post card will be mailed to his home as a reminder to schedule 1 year fu

## 2023-03-20 DIAGNOSIS — N30.10 INTERSTITIAL CYSTITIS: ICD-10-CM

## 2023-03-21 RX ORDER — PENTOSAN POLYSULFATE SODIUM 100 MG/1
CAPSULE, GELATIN COATED ORAL
Qty: 270 CAPSULE | Refills: 3 | Status: SHIPPED | OUTPATIENT
Start: 2023-03-21

## 2024-06-05 ENCOUNTER — OFFICE VISIT (OUTPATIENT)
Dept: AUDIOLOGY | Age: 84
End: 2024-06-05
Payer: MEDICARE

## 2024-06-05 ENCOUNTER — OFFICE VISIT (OUTPATIENT)
Dept: AUDIOLOGY | Age: 84
End: 2024-06-05

## 2024-06-05 DIAGNOSIS — H90.3 SENSORY HEARING LOSS, BILATERAL: Primary | ICD-10-CM

## 2024-06-05 PROCEDURE — 92567 TYMPANOMETRY: CPT | Performed by: AUDIOLOGIST

## 2024-06-05 PROCEDURE — 92557 COMPREHENSIVE HEARING TEST: CPT | Performed by: AUDIOLOGIST

## 2024-06-05 NOTE — PROGRESS NOTES
Diagnostic Hearing Evaluation    Name:  Shailesh Rosenthal  :  1940  Age:  83 y.o.   MRN:  [id number]  Date of Evaluation: 24     HISTORY:     Reason for visit: Difficulty Understanding    Shailesh Rosenthal is being seen today at the request of Dr. Kerr for an initial  evaluation of hearing. Patient reports increased difficulty with speech understanding.  Patient denies otalgia, otorrhea, dizziness, fullness, tinnitus, family history of hearing loss, and notes a positive history of noise exposure.     EVALUATION:    Otoscopic Evaluation:   Right Ear: Unremarkable, canal clear   Left Ear: Non-occluding cerumen, TM view obscured     Tympanometry:   Right Ear: Type A; normal middle ear pressure and static compliance    Left Ear: Type A; normal middle ear pressure and static compliance     Speech Audiometry:  Speech Reception (SRT)    Right Ear: 45 dB HL    Left Ear: 70 dB HL    Word Recognition Scores (WRS):  Right Ear: fair (64 % correct)     Left Ear: very poor (32 % correct)    Stimuli: [id number]    Pure Tone Audiometry:  Conventional pure tone audiometry from 250 - 8000 Hz  was obtained with good reliability and revealed the following:     Right Ear: Normal sloping to severe sensorineural hearing loss (SNHL)    Left Ear: Moderate sloping to profound sensorineural hearing loss (SNHL)     *see attached audiogram    IMPRESSIONS:   There is a significant asymmetry between ears for puretones and WRS with the left ear being worse than the right. This asymmetry has been present for over a year due to a virus.    RECOMMENDATIONS:  Annual hearing eval, Return to [facility]. for F/U, and Copy to Patient/Caregiver    PATIENT EDUCATION:   The results of today's results and recommendations were reviewed with the patient and his hearing thresholds were explained at length. Treatment options, including amplification and communication strategies, were discussed as appropriate. The patient voiced understanding of his test  results. Questions were addressed and the patient was encouraged to contact our department should concerns arise.      Kuldip Braga., CCC-A  Clinical Audiologist  [facility]  [address] [address]

## 2024-06-05 NOTE — PROGRESS NOTES
Hearing Aid Visit:    Name:  Shailesh Rosenthal  :  1940  Age:  83 y.o.  MRN:  [id number]  Date of Evaluation: 24     HISTORY:    Shailesh Rosenthal was seen today (2024) for a(n) in-warranty hearing aid check of his bilateral hearing aids. Today, Shailesh PAUL stated he would like his hearing aids turned up to account for his most recent audiogram.    DEVICE INFORMATION:     Left Device Right Device   Hearing Aid Make: ReSound  ReSound    Hearing Aid Model: unknown unknown   Serial Number: unknown unknown   Repair Warranty Date: unknown unknown   Loss/Damage Warranty Status:   Active  Active        Length/Output unknown unknown   Wax System: CeruStop CeruStop   Dome Size/Style: 10 mm closed 10 mm bass   Battery: Lithium-ion Rechargeable Lithium-ion Rechargeable      Earmold Serial Number: N/A N/A   Earmold Warranty Date:  N/A N/A    Serial Number:  unknown    Warranty Date:  unknown     Accessories: N/A       ACTION/ADJUSTMENTS:    Cleaned both hearing aids of debris, changed wax guards and domes. Listening check revealed clear sound quality. Could not reprogram due to software not being available today.    RECOMMENDATIONS:     Return as scheduled for reprogramming after software is installed.      Kuldip Braga., CCC-A  Clinical Audiologist  [facility]  [facility] [address]  [place] [address]

## 2024-06-12 ENCOUNTER — OFFICE VISIT (OUTPATIENT)
Dept: AUDIOLOGY | Age: 84
End: 2024-06-12

## 2024-06-12 DIAGNOSIS — H90.3 SENSORY HEARING LOSS, BILATERAL: Primary | ICD-10-CM

## 2024-06-12 NOTE — PROGRESS NOTES
Hearing Aid Visit:    Name:  Shailesh Rosenthal  :  1940  Age:  83 y.o.  MRN:  [id number]  Date of Evaluation: 24     HISTORY:    Shailesh Rosenthal was seen today (2024) for a(n) in-warranty hearing aid check of his bilateral hearing aids. Today, Shailesh PAUL feels the hearing aids needs to be louder with better clarity.    DEVICE INFORMATION:     Left Device Right Device   Hearing Aid Make: ReSound  ReSound    Hearing Aid Model: -TYOH -YQFQ   Serial Number: [id number]   Repair Warranty Date: unknown unknown   Loss/Damage Warranty Status:   Active  Active        Length/Output 3 HP 3 HP   Wax System: CeruStop CeruStop   Dome Size/Style: 7mm Power Dome 7mm Power Dome   Battery: Lithium-ion Rechargeable Lithium-ion Rechargeable      Earmold Serial Number: N/A N/A   Earmold Warranty Date:  N/A N/A    Serial Number:  unknown    Warranty Date:  unknown     Accessories: N/A       ACTION/ADJUSTMENTS:    Replaced medium power receivers with high power receivers.    RECOMMENDATIONS:     Return PRN.      Noel Braga, CCC-A  Clinical Audiologist  [facility]  [address] [address]

## 2024-11-07 ENCOUNTER — OFFICE VISIT (OUTPATIENT)
Dept: PODIATRY | Facility: CLINIC | Age: 84
End: 2024-11-07
Payer: MEDICARE

## 2024-11-07 VITALS — WEIGHT: 209 LBS | BODY MASS INDEX: 27.57 KG/M2

## 2024-11-07 DIAGNOSIS — B35.1 ONYCHOMYCOSIS: Primary | ICD-10-CM

## 2024-11-07 DIAGNOSIS — B35.3 TINEA PEDIS OF RIGHT FOOT: ICD-10-CM

## 2024-11-07 PROCEDURE — 99203 OFFICE O/P NEW LOW 30 MIN: CPT | Performed by: PODIATRIST

## 2024-11-07 NOTE — PROGRESS NOTES
Ambulatory Visit  Name: Shailesh Rosenthal      : 1940      MRN: [id number]  Encounter Provider: Benton Ash DPM  Encounter Date: 2024   Encounter department: [facility] P[facility] [place]    Explained to patient and wife that they are dealing with both onychomycosis of the right foot toenails and chronic tinea pedis.    I personally reviewed a comprehensive metabolic panel dated 2024.  Liver enzymes were within normal limits.    The patient does take Lipitor regularly.  Pulsed dose Lamisil is a treatment option that could effectively eliminate the athlete's foot and hopefully the toenails as well.  However, patient prefers to avoid additional oral meds at this time.    Treatment consisted of nail trimming.  Advised him to continue with topical Lotrimin AF.  Reappoint 10 weeks for nail care.  Patient understands that this is not a covered service.    Assessment & Plan  Onychomycosis         Tinea pedis of right foot           History of Present Illness     Shailesh Rosenthal is a 84 y.o. male who presents with his wife.  The patient is here primarily for toenail care.  He is new to the area.  He has a known fungal infection affecting the toenails and scaling on the right foot.  He has been using Lotrimin on the right foot but possibly only on his toenails.  This nail infection has been present for over 30 years.  He relates no pain from the infection.      Review of Systems   HENT:  Positive for hearing loss.    Musculoskeletal: Negative.    Skin:  Positive for rash.   Psychiatric/Behavioral: Negative.                 Objective     Wt 94.8 kg (209 lb)   BMI 27.57 kg/m²     Physical Exam  Constitutional:       Appearance: Normal appearance.   Cardiovascular:      Pulses: Normal pulses.   Musculoskeletal:         General: Normal range of motion.   Skin:     Findings: Rash present.      Comments: All right foot toenails are thick and yellow with subungual debris.  Skin on the sole of the right  foot dry scaly and erythematous.  No dystrophy noted in toenails of left foot nor is there any left foot rash.

## 2025-03-03 ENCOUNTER — OFFICE VISIT (OUTPATIENT)
Dept: PODIATRY | Facility: CLINIC | Age: 85
End: 2025-03-03
Payer: MEDICARE

## 2025-03-03 VITALS — BODY MASS INDEX: 28.63 KG/M2 | HEIGHT: 73 IN | WEIGHT: 216 LBS

## 2025-03-03 DIAGNOSIS — B35.1 ONYCHOMYCOSIS: Primary | ICD-10-CM

## 2025-03-03 PROCEDURE — 11721 DEBRIDE NAIL 6 OR MORE: CPT | Performed by: PODIATRIST

## 2025-03-03 PROCEDURE — 99212 OFFICE O/P EST SF 10 MIN: CPT | Performed by: PODIATRIST

## 2025-03-03 NOTE — PROGRESS NOTES
PATIENT:  Shailesh Rosenthal  1940    ASSESSMENT/PLAN:  1. Onychomycosis               The patient was educated in daily foot assessment and proper foot care.  The patient will follow up in 10 weeks for foot care.      PROCEDURE:  All toenails were reduced and debrided in length, width, and girth using a nail nipper and dremel.  Patient tolerated procedure(s) well without complications.        HPI:  Shailesh Rosenthal is a 84 y.o.year old male seen for foot care.   No history of diabetes.  The patient denied any acute pedal disorder, redness, acute swelling, or recent injury.          PAST MEDICAL HISTORY:  Past Medical History:   Diagnosis Date    Bladder pain     Dysuria     Hematospermia     Interstitial cystitis     Microscopic hematuria        PAST SURGICAL HISTORY:  Past Surgical History:   Procedure Laterality Date    BACK SURGERY  2015    CYSTOSCOPY      CYSTOSTOMY W/ BLADDER BIOPSY  2011        ALLERGIES:  Gluten meal - food allergy    MEDICATIONS:  Current Outpatient Medications   Medication Sig Dispense Refill    atorvastatin (LIPITOR) 10 mg tablet       Elmiron 100 MG capsule TAKE 1 CAPSULE THREE TIMES A  capsule 3    lisinopril (ZESTRIL) 5 mg tablet Take 1 tablet by mouth daily      Multiple Vitamin (DAILY VITAMINS) tablet Take by mouth      aspirin (ECOTRIN LOW STRENGTH) 81 mg EC tablet Take 81 mg by mouth daily (Patient not taking: Reported on 3/3/2025)      Calcium Glycerophosphate 340 (65-50) MG (CA-P) TABS Take by mouth (Patient not taking: Reported on 9/23/2022)       No current facility-administered medications for this visit.       SOCIAL HISTORY:  Social History     Socioeconomic History    Marital status: /Civil Union     Spouse name: None    Number of children: None    Years of education: None    Highest education level: None   Occupational History    None   Tobacco Use    Smoking status: Never    Smokeless tobacco: Never   Vaping Use    Vaping status:  Never Used   Substance and Sexual Activity    Alcohol use: Yes     Comment: 1/month    Drug use: No    Sexual activity: None   Other Topics Concern    None   Social History Narrative    None     Social Drivers of Health     Financial Resource Strain: Low Risk  (10/2/2023)    Received from [facility]    Overall Financial Resource Strain (CARDIA)     Difficulty of Paying Living Expenses: Not hard at all   Food Insecurity: No Food Insecurity (10/2/2023)    Received from [facility]    Hunger Vital Sign     Worried About Running Out of Food in the Last Year: Never true     Ran Out of Food in the Last Year: Never true   Transportation Needs: No Transportation Needs (10/2/2023)    Received from [facility]    PRAPARE - Transportation     Lack of Transportation (Medical): No     Lack of Transportation (Non-Medical): No   Physical Activity: Not on file   Stress: No Stress Concern Present (10/2/2023)    Received from [facility]    [employer] - Occupational Stress Questionnaire     Feeling of Stress : Only a little   Social Connections: Moderately Integrated (10/2/2023)    Received from [facility]    Social Connection and Isolation Panel [NHANES]     Frequency of Communication with Friends and Family: Once a week     Frequency of Social Gatherings with Friends and Family: Once a week     Attends L[employer] Services: 1 to 4 times per year     Active Member of Clubs or Organizations: Yes     Attends Club or Organization Meetings: 1 to 4 times per year     Marital Status:    Intimate Partner Violence: Not At Risk (10/2/2023)    Received from [facility]    Humiliation, Afraid, Rape, and Kick questionnaire     Fear of Current or Ex-Partner: No     Emotionally Abused: No     Physically Abused: No     Sexually Abused: No   Housing Stability: Not on file        REVIEW OF SYSTEMS:  GENERAL: No  "fever or chills  HEART: No chest pain, or palpitation  RESPIRATORY:  No acute SOB or cough  GI: No Nausea, vomit or diarrhea  NEUROLOGIC: No syncope or acute weakness    PHYSICAL EXAM:    Ht 6' 1\" (1.854 m)   Wt 98 kg (216 lb)   BMI 28.50 kg/m²     VASCULAR EXAM  Pedal pulses are palpable.  CRT is WNL.     NEUROLOGIC EXAM  Sensation is intact to light touch.  No focal neurologic deficit.          DERMATOLOGIC EXAM:   No ulcer or cellulitis noted.  The patient has hypertrophic toenails with discoloration, onycholysis, and subungal debris.     MUSCULOSKELETAL EXAM:   No acute joint pain, edema, or redness.  No acute musculoskeletal problem.                "

## 2025-05-07 DIAGNOSIS — N30.10 INTERSTITIAL CYSTITIS: ICD-10-CM

## 2025-05-07 RX ORDER — PENTOSAN POLYSULFATE SODIUM 100 MG/1
100 CAPSULE, GELATIN COATED ORAL 3 TIMES DAILY
Qty: 270 CAPSULE | Refills: 3 | OUTPATIENT
Start: 2025-05-07

## 2025-05-07 NOTE — TELEPHONE ENCOUNTER
According to chart review, the patient is been seen in office since September 2022 by Malika James PA-C. Recommend patient is reevaluated in office before repeat medication is sent to his pharmacy.  If he denies any changes, medication can be prescribed per his PCP.

## 2025-05-07 NOTE — TELEPHONE ENCOUNTER
According to chart, pt is under the care of [facility] and saw them in January.

## 2025-05-21 ENCOUNTER — OFFICE VISIT (OUTPATIENT)
Dept: PODIATRY | Facility: CLINIC | Age: 85
End: 2025-05-21

## 2025-05-21 VITALS — WEIGHT: 216 LBS | BODY MASS INDEX: 28.63 KG/M2 | HEIGHT: 73 IN

## 2025-05-21 DIAGNOSIS — M79.675 PAIN IN TOES OF BOTH FEET: ICD-10-CM

## 2025-05-21 DIAGNOSIS — B35.1 ONYCHOMYCOSIS: Primary | ICD-10-CM

## 2025-05-21 DIAGNOSIS — M79.674 PAIN IN TOES OF BOTH FEET: ICD-10-CM

## 2025-05-21 DIAGNOSIS — G62.9 NEUROPATHY: ICD-10-CM

## 2025-05-21 RX ORDER — CIDER VINEGAR 300 MG
2 TABLET ORAL DAILY
COMMUNITY

## 2025-05-21 NOTE — PROGRESS NOTES
"Name: Shailesh Rosenthal      : 1940      MRN: [id number]  Encounter Provider: Gonzalez Hawkins DPM  Encounter Date: 2025   Encounter department: [facility] [place]  :  Assessment & Plan  Onychomycosis       Debride mycotic nails and thin the nail plates x 5 with the use of a nail nipper manually and an electric Dremel bur was used to reduce the thickness of the nail beds and smoothed the distal aspect of the nails.   Neuropathy         Pain in toes of both feet         Pt was instructed to use lotion once a day on both feet such as cerave, Cetaphil or similar thick style of lotion.   Recommended that he increase his water intake to 412 to 16 ounce bottles of water in a day from his current 1-2.    Discussed proper shoe gear, daily inspections of feet, and general foot health with patient. Patient has Q9  findings and is recommended for at risk foot care every 9-10 weeks.    Patients most recent complete clinical foot exam was on: 2024    Return in about 10 weeks (around 2025).     History of Present Illness   HPI  Shailesh Rosenthal is a 84 y.o. male who presents with chief complaint of painful thick nails on his right foot.  No change in medical history or medication since his last visit.  History obtained from: patient    Review of Systems  Medical History Reviewed by provider this encounter:     .  Medications Ordered Prior to Encounter[1]   Social History[2]     Objective   Ht 6' 1\" (1.854 m) Comment: verbal  Wt 98 kg (216 lb)   BMI 28.50 kg/m²      Physical Exam  Vascular status is 1/4 DP PT negative digital hair, normal distal cooling, immediate capillary refill bilaterally.  Capillary refill is approximately 2 seconds.  Slight edema is noted on the right extremity.    Derm nails are brittle elongated hypertrophic white-yellow discoloration with subungual debris x 5.  There is an increased thickness in the nails of approximately 2 mm.  There is white flaky tissue " present on the plantar aspect of the right foot especially in the area of the heel.  No signs of any fissures or dried blood present.    Neuro 0.5 monofilament test was performed on the plantar aspect of both feet and the patient was having some difficulty ascertaining if depression was there or not.  The sites that were tested were the plantar aspect of the hallux, plantar aspect of the 3rd and 4th toes, plantar aspect of the arch, and submet 3.  Patient was unable to feel any of those.       [1]   Current Outpatient Medications on File Prior to Visit   Medication Sig Dispense Refill    atorvastatin (LIPITOR) 10 mg tablet       Elmiron 100 MG capsule TAKE 1 CAPSULE THREE TIMES A  capsule 3    lisinopril (ZESTRIL) 5 mg tablet Take 1 tablet by mouth in the morning.      Multiple Vitamin (DAILY VITAMINS) tablet Take by mouth      Omega-3 Fatty Acids (Fish Oil) 1000 MG CPDR Take 2 g by mouth daily      aspirin (ECOTRIN LOW STRENGTH) 81 mg EC tablet Take 81 mg by mouth daily (Patient not taking: Reported on 3/3/2025)      Calcium Glycerophosphate 340 (65-50) MG (CA-P) TABS Take by mouth (Patient not taking: Reported on 9/23/2022)       No current facility-administered medications on file prior to visit.   [2]   Social History  Tobacco Use    Smoking status: Never    Smokeless tobacco: Never   Vaping Use    Vaping status: Never Used   Substance and Sexual Activity    Alcohol use: Yes     Comment: 1/month    Drug use: No